# Patient Record
(demographics unavailable — no encounter records)

---

## 2025-03-05 NOTE — PHYSICAL EXAM
[General Appearance - Well Developed] : well developed [General Appearance - Well Nourished] : well nourished [Normal Appearance] : normal appearance [Well Groomed] : well groomed [Edema] : no peripheral edema [] : no respiratory distress [Abdomen Soft] : soft [Normal Station and Gait] : the gait and station were normal for the patient's age [Skin Color & Pigmentation] : normal skin color and pigmentation [No Focal Deficits] : no focal deficits [Oriented To Time, Place, And Person] : oriented to person, place, and time [Affect] : the affect was normal [Mood] : the mood was normal [Not Anxious] : not anxious [de-identified] : Well-healed R flank incision

## 2025-03-05 NOTE — HISTORY OF PRESENT ILLNESS
[FreeTextEntry1] : 34M smoker w no known PMH presents for nephrolithiasis and R UPJO. He denies any urologic history before 2023.  12/2023, open R pyeloplasty in [place]? Ureteral stent was removed in 4/2024.  2/28/25, presented to ER w acute onset R flank pain. CT showed multiple R renal stones (<9 mm) and possible UPJO. Cr 1.2. UCx negative. UA showed glucose > 500.  Today, he reports persistent R flank pain. He denies fevers and dysuria.

## 2025-03-05 NOTE — ASSESSMENT
[FreeTextEntry1] : 34M smoker w no known PMH presents for nephrolithiasis and R UPJO. We need to know if there is a functional UPJO before addressing the renal stones.  -NM renal scan -A1C, BMP -PCP referral -d/c tobacco

## 2025-03-26 NOTE — HISTORY OF PRESENT ILLNESS
[FreeTextEntry1] : 34M smoker w no known PMH presents for nephrolithiasis and R UPJO. He denies any urologic history before 2023.  12/2023, open R pyeloplasty in [place]? Ureteral stent was removed in 4/2024.  2/28/25, presented to ER w acute onset R flank pain. CT showed 3 R renal stones (<9 mm) and possible UPJO. Cr 1.2. UCx negative.   3/13/25, NM renal scan showed 49% split function on the right w/o obstruction.  Today, he reports persistent R flank pain. He denies fevers and dysuria.

## 2025-03-26 NOTE — ASSESSMENT
[FreeTextEntry1] : 34M smoker w no known PMH presents for nephrolithiasis and R UPJO s/p pyeloplasty 2023. He has normal function and drainage of the R kidney, but he has a significant stone burden.  -Refer to Dr. Gauthier to consider PCNL

## 2025-03-28 NOTE — ASSESSMENT
[FreeTextEntry1] : PCNL Procedure, in hospital stay and recovery discussed with the patient. Potential complications of bleeding, transfusion, selective angioembolization if indicated, adjacent organ injury, fever, sepsis, infection, incomplete stone clearance, bowel or other unusual injury. chest complications, vascular injuries, procedures needed to address any complications, ureteral injury, anesthetic complications, all discussed.  Printed information including all of the above and more provided to the patient.  Discussed possibility of UPJ repair in the future if there is no resolution   Plan  -Plan for right PCNL

## 2025-03-28 NOTE — HISTORY OF PRESENT ILLNESS
[FreeTextEntry1] : 35 y/o M referred by Dr. Barron for nephrolithiasis treatment  Yoruba interpretation used for interview  No known history of kidney stones  s/p open right pyeloplasty in [place] (Dec 2023) s/p stent removal April 2024 also in [facility] of malignancy Daily tobacco smoker, 17 pack year history   Pt has had persistent right flank pain since surgery.  Was seen in [facility] ER last month due to right flank pain and found multiple stones in right kidney and suspected right UPJ    Reviewed CT (Feb '25)-There are approximately 6 right renal stones, largest 12 mm with associated moderate right hydronephrosis. Right renal pelvis dilatation without obstruction suggesting UPJ Reviewed renal scan (Mar '25): Delayed left renal emptying but no obstruction. Left 51%, Right 49% Reviewed urine culture-no growth Reviewed lab: CRE 1.05  PCNL Procedure, in hospital stay and recovery discussed with the patient. Potential complications of bleeding, transfusion, selective angioembolization if indicated, adjacent organ injury, fever, sepsis, infection, incomplete stone clearance, bowel or other unusual injury. chest complications, vascular injuries, procedures needed to address any complications, ureteral injury, anesthetic complications, all discussed.  Printed information including all of the above and more provided to the patient.

## 2025-03-28 NOTE — HISTORY OF PRESENT ILLNESS
[FreeTextEntry1] : 35 y/o M referred by Dr. Barron for nephrolithiasis treatment  Lao interpretation used for interview  No known history of kidney stones  s/p open right pyeloplasty in [place] (Dec 2023) s/p stent removal April 2024 also in [facility] of malignancy Daily tobacco smoker, 17 pack year history   Pt has had persistent right flank pain since surgery.  Was seen in [facility] ER last month due to right flank pain and found multiple stones in right kidney and suspected right UPJ    Reviewed CT (Feb '25)-There are approximately 6 right renal stones, largest 12 mm with associated moderate right hydronephrosis. Right renal pelvis dilatation without obstruction suggesting UPJ Reviewed renal scan (Mar '25): Delayed left renal emptying but no obstruction. Left 51%, Right 49% Reviewed urine culture-no growth Reviewed lab: CRE 1.05  PCNL Procedure, in hospital stay and recovery discussed with the patient. Potential complications of bleeding, transfusion, selective angioembolization if indicated, adjacent organ injury, fever, sepsis, infection, incomplete stone clearance, bowel or other unusual injury. chest complications, vascular injuries, procedures needed to address any complications, ureteral injury, anesthetic complications, all discussed.  Printed information including all of the above and more provided to the patient.

## 2025-05-02 NOTE — HISTORY OF PRESENT ILLNESS
[FreeTextEntry1] : WOLF and Preop [de-identified] : AWV and Preop for Right side Lithotripsy.  T waves changes on EKG.  send to Cardio for clearance.  for May 13th

## 2025-05-12 NOTE — DISCUSSION/SUMMARY
[FreeTextEntry1] : 1) Pre-operative assessment:  Abnormal EKG with ST-T wave changes/inversions.  Had some atypical CP recently, has multiple risk factors for CAD even at young age. Will order an echocardiogram and nuclear stress test given these risk factors. 2) Pre-DM: Discussed cardiovascular risk and healthy diet.  3) HLD: Lifestyle modifications for now, May need statin in future given DM risk  4) Smoker: Discussed smoking cessation  5) Abnormal EKG: Obtaining echocardiogram and stress test

## 2025-05-12 NOTE — ADDENDUM
[FreeTextEntry1] : Patient had both a stress test and echocardiogram. LV EF is normal.  Stress test showed an artifact, but i personally reviewed it and is otherwise normal.   Patient is low risk for upcoming procedure. No further testing is needed.